# Patient Record
Sex: MALE | Race: WHITE | NOT HISPANIC OR LATINO
[De-identification: names, ages, dates, MRNs, and addresses within clinical notes are randomized per-mention and may not be internally consistent; named-entity substitution may affect disease eponyms.]

---

## 2017-03-04 PROBLEM — Z00.00 ENCOUNTER FOR PREVENTIVE HEALTH EXAMINATION: Status: ACTIVE | Noted: 2017-03-04

## 2017-03-21 ENCOUNTER — APPOINTMENT (OUTPATIENT)
Dept: OTOLARYNGOLOGY | Facility: CLINIC | Age: 42
End: 2017-03-21

## 2017-03-21 VITALS
HEIGHT: 76 IN | DIASTOLIC BLOOD PRESSURE: 82 MMHG | HEART RATE: 84 BPM | BODY MASS INDEX: 30.44 KG/M2 | SYSTOLIC BLOOD PRESSURE: 130 MMHG | WEIGHT: 250 LBS

## 2017-03-21 DIAGNOSIS — Z78.9 OTHER SPECIFIED HEALTH STATUS: ICD-10-CM

## 2017-03-21 DIAGNOSIS — Z82.49 FAMILY HISTORY OF ISCHEMIC HEART DISEASE AND OTHER DISEASES OF THE CIRCULATORY SYSTEM: ICD-10-CM

## 2017-03-26 PROBLEM — Z82.49 FAMILY HISTORY OF CONGESTIVE HEART FAILURE: Status: ACTIVE | Noted: 2017-03-21

## 2017-03-26 PROBLEM — Z82.49 FAMILY HISTORY OF HYPERTENSION: Status: ACTIVE | Noted: 2017-03-21

## 2017-03-26 PROBLEM — Z78.9 NON-SMOKER: Status: ACTIVE | Noted: 2017-03-21

## 2017-06-27 ENCOUNTER — APPOINTMENT (OUTPATIENT)
Dept: OTOLARYNGOLOGY | Facility: CLINIC | Age: 42
End: 2017-06-27

## 2017-06-27 VITALS
HEIGHT: 76 IN | BODY MASS INDEX: 30.44 KG/M2 | WEIGHT: 250 LBS | SYSTOLIC BLOOD PRESSURE: 139 MMHG | HEART RATE: 80 BPM | DIASTOLIC BLOOD PRESSURE: 83 MMHG

## 2017-11-14 ENCOUNTER — APPOINTMENT (OUTPATIENT)
Dept: OTOLARYNGOLOGY | Facility: CLINIC | Age: 42
End: 2017-11-14

## 2018-02-06 ENCOUNTER — APPOINTMENT (OUTPATIENT)
Dept: OTOLARYNGOLOGY | Facility: CLINIC | Age: 43
End: 2018-02-06
Payer: COMMERCIAL

## 2018-02-06 VITALS
HEART RATE: 81 BPM | BODY MASS INDEX: 30.44 KG/M2 | WEIGHT: 250 LBS | DIASTOLIC BLOOD PRESSURE: 78 MMHG | SYSTOLIC BLOOD PRESSURE: 121 MMHG | HEIGHT: 76 IN

## 2018-02-06 PROCEDURE — 31231 NASAL ENDOSCOPY DX: CPT

## 2018-02-06 PROCEDURE — 99213 OFFICE O/P EST LOW 20 MIN: CPT | Mod: 25

## 2018-05-04 ENCOUNTER — RX RENEWAL (OUTPATIENT)
Age: 43
End: 2018-05-04

## 2018-06-19 ENCOUNTER — APPOINTMENT (OUTPATIENT)
Dept: OTOLARYNGOLOGY | Facility: CLINIC | Age: 43
End: 2018-06-19
Payer: COMMERCIAL

## 2018-06-19 VITALS
BODY MASS INDEX: 30.44 KG/M2 | DIASTOLIC BLOOD PRESSURE: 86 MMHG | WEIGHT: 250 LBS | HEIGHT: 76 IN | HEART RATE: 83 BPM | SYSTOLIC BLOOD PRESSURE: 144 MMHG

## 2018-06-19 PROCEDURE — 31231 NASAL ENDOSCOPY DX: CPT

## 2018-06-19 PROCEDURE — 99214 OFFICE O/P EST MOD 30 MIN: CPT | Mod: 25

## 2018-06-19 RX ORDER — METHYLPREDNISOLONE 4 MG/1
4 TABLET ORAL
Qty: 1 | Refills: 0 | Status: ACTIVE | COMMUNITY
Start: 2018-06-19 | End: 1900-01-01

## 2018-06-25 RX ORDER — AMOXICILLIN 500 MG/1
500 CAPSULE ORAL
Qty: 10 | Refills: 0 | Status: ACTIVE | COMMUNITY
Start: 2018-06-14

## 2018-07-28 PROBLEM — Z78.9 ALCOHOL USE: Status: ACTIVE | Noted: 2017-03-21

## 2018-08-07 ENCOUNTER — APPOINTMENT (OUTPATIENT)
Dept: OTOLARYNGOLOGY | Facility: CLINIC | Age: 43
End: 2018-08-07

## 2018-09-06 RX ORDER — AZITHROMYCIN 250 MG/1
250 TABLET, FILM COATED ORAL
Qty: 1 | Refills: 0 | Status: ACTIVE | COMMUNITY
Start: 2018-09-06 | End: 1900-01-01

## 2018-09-06 RX ORDER — METHYLPREDNISOLONE 4 MG/1
4 TABLET ORAL
Qty: 1 | Refills: 0 | Status: ACTIVE | COMMUNITY
Start: 2018-09-06 | End: 1900-01-01

## 2018-10-09 ENCOUNTER — RX RENEWAL (OUTPATIENT)
Age: 43
End: 2018-10-09

## 2019-03-01 ENCOUNTER — RX RENEWAL (OUTPATIENT)
Age: 44
End: 2019-03-01

## 2019-03-12 ENCOUNTER — APPOINTMENT (OUTPATIENT)
Dept: OTOLARYNGOLOGY | Facility: CLINIC | Age: 44
End: 2019-03-12
Payer: COMMERCIAL

## 2019-03-12 PROCEDURE — 69210 REMOVE IMPACTED EAR WAX UNI: CPT

## 2019-03-12 PROCEDURE — 31231 NASAL ENDOSCOPY DX: CPT

## 2019-03-12 PROCEDURE — 99214 OFFICE O/P EST MOD 30 MIN: CPT | Mod: 25

## 2019-03-12 NOTE — CONSULT LETTER
[Dear  ___] : Dear  [unfilled], [Courtesy Letter:] : I had the pleasure of seeing your patient, [unfilled], in my office today. [Consult Closing:] : Thank you very much for allowing me to participate in the care of this patient.  If you have any questions, please do not hesitate to contact me. [Sincerely,] : Sincerely, [Ruben Burciaga MD] : Ruben Burciaga MD  [Department of Otolaryngology, Head and Neck Surgery] : Department of Otolaryngology, Head and Neck Surgery [[facility]] : [facility]

## 2019-03-12 NOTE — HISTORY OF PRESENT ILLNESS
[de-identified] : 43M here for in followup.\par \par He is doing well since last seen 9 months prior with no new issues. There is mild intermittent frontal pressure.\par I have seen him several times in management of long standing CRS with polyposis.\par No new sinus issues since last seen. No further sinus infections. Remains on the daily qnasl.\par H/o 4 prior ESS for CRS w polyposis. Most recent surgery 3/2014.\par Remains on immunotherapy, q3week maintenance shots.\par He is doing well. Olfaction intact. No rhinorrhea.\par Hearing normal, no ear fullness or pressure.\par \par Last infection was >1yr ago and was given zpak and it resolved. Needed oral steroid taper once this past year. Prior to that, he does not remember the last time he took oral steroids/abx for infection.\par \par ROS otherwise negative.

## 2019-03-12 NOTE — PHYSICAL EXAM
[Midline] : trachea located in midline position [Normal] : no rashes [Rinne Test Air Conduction Persists > Bone Conduction Right] : air conduction greater than bone conduction on the right [Rinne Test Air Conduction Persists > Bone Conduction Left] : air conduction greater than bone conduction on the left [Hearing Hawkins Test (Tuning Fork On Forehead)] : no lateralization of tone [FreeTextEntry1] : Au: mild cerumen removed. eac clear and dry, TM intact and mobile, ME clear no effusion\par

## 2019-03-12 NOTE — ASSESSMENT
[FreeTextEntry1] : 43M with chronic rhinosinusitis with polyposis here in followup. He is doing well since last seen 9 months prior with no new issues. He is s/p four prior ESS, most recently in 2014. He has done quite well since the last surgery and has only had one sinus infection in the past few years. He remains on daily qnasl and immunotherapy for q3wk maintenance shots. His last infection was >1yr ago and was given zpak and it resolved. He has needed oral steroid taper once this past year. Prior to that, he does not remember the last time he took oral steroids/abx for infection. On exam today, nasal endoscopy shows slightly increased sinonasal polyposis burden; there are patent middle meati, antrostomies and sphenoids, but polyps/polypoid edema fill the ethmoidectomy bed, the line skull base and occlude both frontal outflows. Despite his chronic polyp burden, he still remains stable with minimal complaints. His frontal pressure is due to frontal occlusion due to polyposis. Cont daily qnasl and IT as before. Will see back in 4-6 months, or sooner, should anything develop in the interim. He may need revision surgery in the future should his polyps continue to regrow.

## 2019-03-12 NOTE — PROCEDURE
[FreeTextEntry3] : Nasal Endoscopy:\par R NC: IT lateralized. MM patent, antrostomy patent; polyps fill ethmoidectomy bed and line skull base and occlude frontal outflow w slight increased polyp burden; sphenoid patent. no mucopus. thick crust removed from posterior nasal cavity.\par L NC: IT lateralized. s/p partial middle turbinectomy w middle turbinate remnant abutting the posterior antrostomy. widely patent antrostomy w mild recirculation mucus removed. polyps fill ethmoidectomy bed, line skull base and occlude frontal outflow w interval increase; sphenoid patent. no mucopus.

## 2019-09-03 ENCOUNTER — APPOINTMENT (OUTPATIENT)
Dept: OTOLARYNGOLOGY | Facility: CLINIC | Age: 44
End: 2019-09-03

## 2019-11-04 RX ORDER — BECLOMETHASONE DIPROPIONATE 80 UG/1
80 AEROSOL, METERED NASAL
Qty: 1 | Refills: 5 | Status: ACTIVE | COMMUNITY
Start: 2017-03-23 | End: 1900-01-01

## 2019-11-12 ENCOUNTER — APPOINTMENT (OUTPATIENT)
Dept: OTOLARYNGOLOGY | Facility: CLINIC | Age: 44
End: 2019-11-12
Payer: COMMERCIAL

## 2019-11-12 ENCOUNTER — LABORATORY RESULT (OUTPATIENT)
Age: 44
End: 2019-11-12

## 2019-11-12 PROCEDURE — 99213 OFFICE O/P EST LOW 20 MIN: CPT | Mod: 25

## 2019-11-12 PROCEDURE — 31231 NASAL ENDOSCOPY DX: CPT

## 2019-11-12 RX ORDER — METHYLPREDNISOLONE 4 MG/1
4 TABLET ORAL
Qty: 1 | Refills: 0 | Status: ACTIVE | COMMUNITY
Start: 2019-11-12 | End: 1900-01-01

## 2019-11-12 NOTE — HISTORY OF PRESENT ILLNESS
[de-identified] : 43M here in followup.\par \par Over the past 2 weeks or so, he has had thick mucus, congestion and postnasal drainage with facial pressure. During this time, he is spitting up thick blobs of bloody mucus. He has been using neilmed rinses for the past 2-3 days without improvement. He remains on daily qnasl.\par One month ago he developed a bronchitis with coughing, but this resolved. Then the above sx developed which have persisted for the past 2 weeks.\par \par He has long standing h/o CRS with polyposis and I have seen him many times regarding it, most recently 9 months ago.\par H/o 4 prior ESS for CRS w polyposis. Most recent surgery 3/2014.\par Remains on immunotherapy, q3week maintenance shots.\par \par Last infection was nearly 2 years ago and was given zpak and it resolved. Needed oral steroid taper once in >1yr. Prior to that, he does not remember the last time he took oral steroids/abx for infection.\par \par ROS otherwise negative.

## 2019-11-12 NOTE — PHYSICAL EXAM
[Midline] : trachea located in midline position [Normal] : no rashes [Rinne Test Air Conduction Persists > Bone Conduction Right] : air conduction greater than bone conduction on the right [Rinne Test Air Conduction Persists > Bone Conduction Left] : air conduction greater than bone conduction on the left [Hearing Hawkins Test (Tuning Fork On Forehead)] : no lateralization of tone [FreeTextEntry1] : \par

## 2019-11-12 NOTE — CONSULT LETTER
[Dear  ___] : Dear  [unfilled], [Consult Closing:] : Thank you very much for allowing me to participate in the care of this patient.  If you have any questions, please do not hesitate to contact me. [Courtesy Letter:] : I had the pleasure of seeing your patient, [unfilled], in my office today. [Sincerely,] : Sincerely, [[facility]] : [facility] [Ruben Burciaga MD] : Ruben Burciaga MD  [Department of Otolaryngology, Head and Neck Surgery] : Department of Otolaryngology, Head and Neck Surgery

## 2019-11-12 NOTE — ASSESSMENT
[FreeTextEntry1] : 43M here in followup. He has long standing h/o chronic rhinosinusitis with polyposis; I have seen him many times regarding it, most recently 9 months ago.\par Over the past 2 weeks or so, he has had thick mucus, congestion and postnasal drainage with facial pressure. During this time, he is spitting up thick blobs of bloody mucus. He has been using neilmed rinses for the past 2-3 days without improvement. He remains on daily qnasl and allergy shots.\par One month ago he developed a bronchitis with coughing, but this resolved. Then the above sx developed which have persisted for the past 2 weeks. Prior to this, he had been doing quite well with only one course of steroids and antibiotics in nearly 2 years.\par On exam today, nasal endoscopy shows increased polyp burden and sinonasal mucosal edema with thin mucoid drainage. There is buildup of thick tenacious crust overlying the nasopharynx which was removed.\par He has acute on chronic exacerbation of his sinonasal disease/polyps and there is also buildup of thick crusting over his nasopharynx, the latter of which I am unsure as to why, since he has not had this in the past. Either way, he needs to reset inflammatory cycle so will give oral steroid taper. Also, needs to optimize nasal hygiene given his prior surgeries and underlying mucosal disease with steroid rinses (he had only been doing qnasl for years), will start him on mometasone rinses. Lastly, I will add abx (topical vs oral) pending cx results. Hopefully this will be sufficient and sx will improve. He will let me know how he is doing over the next 2-3 weeks.

## 2019-11-12 NOTE — PROCEDURE
[FreeTextEntry3] : Nasal Endoscopy:\par R NC: IT lateralized. MM patent, antrostomy patent; polyps fill ethmoidectomy bed and line skull base and occlude frontal outflow w increased polyp burden and thin mucoid drainage\par L NC: IT lateralized. s/p partial middle turbinectomy w middle turbinate remnant abutting the posterior antrostomy. widely patent antrostomy w mucosal edema and thin mucoid drainage. polyps fill ethmoidectomy bed, line skull base and occlude frontal outflow w interval increase in edema\par thick tenacious crust removed from nasopharyngeal wall, cx taken

## 2019-11-18 RX ORDER — SULFAMETHOXAZOLE AND TRIMETHOPRIM 800; 160 MG/1; MG/1
800-160 TABLET ORAL TWICE DAILY
Qty: 20 | Refills: 0 | Status: ACTIVE | COMMUNITY
Start: 2019-11-18 | End: 1900-01-01

## 2022-07-19 ENCOUNTER — APPOINTMENT (OUTPATIENT)
Dept: OTOLARYNGOLOGY | Facility: CLINIC | Age: 47
End: 2022-07-19

## 2022-07-19 VITALS
WEIGHT: 250 LBS | DIASTOLIC BLOOD PRESSURE: 97 MMHG | TEMPERATURE: 97.9 F | BODY MASS INDEX: 30.44 KG/M2 | HEART RATE: 115 BPM | SYSTOLIC BLOOD PRESSURE: 127 MMHG | HEIGHT: 76 IN

## 2022-07-19 DIAGNOSIS — H91.90 UNSPECIFIED HEARING LOSS, UNSPECIFIED EAR: ICD-10-CM

## 2022-07-19 DIAGNOSIS — H61.23 IMPACTED CERUMEN, BILATERAL: ICD-10-CM

## 2022-07-19 PROCEDURE — 69210 REMOVE IMPACTED EAR WAX UNI: CPT

## 2022-07-19 PROCEDURE — 31231 NASAL ENDOSCOPY DX: CPT | Mod: 59

## 2022-07-19 PROCEDURE — 99213 OFFICE O/P EST LOW 20 MIN: CPT | Mod: 25

## 2022-07-19 NOTE — ASSESSMENT
[FreeTextEntry1] : 46M w long standing h/o chronic rhinosinusitis with polyposis here in followup. He is doing very well since last seen >2 yrs ago. He sx of thick mucus, congestion and postnasal drainage with facial pressure remain controlled. He uses mometasone/mupirocin rinses around 5-10 times per month which help a great deal. He thinks cerumen has reaccumulated. He has done well and has only been on abx/steroids twice in around 4 years.\par On exam today, nasal endoscopy shows persistent moderate/moderately severe polyp burden (right>left) and polypoid sinonasal mucosal edema; there is no mucopus and the nasopharynx is patent without debris.\par He has chronic pansinusitis w polyposis and despite polyp/inflammatory burden, feels mostly baseline. He is much better w the mometasone/mupirocin rinses - so to get better baseline sx control, should do it w more regularity. There is no doubt his baseline can be much better whether he knows it or not. Cerumen was removed from either EAC w relief. RTO 6 months.

## 2022-07-19 NOTE — PHYSICAL EXAM
[Midline] : trachea located in midline position [Normal] : no rashes [Rinne Test Air Conduction Persists > Bone Conduction Right] : air conduction greater than bone conduction on the right [Rinne Test Air Conduction Persists > Bone Conduction Left] : air conduction greater than bone conduction on the left [Hearing Hawkins Test (Tuning Fork On Forehead)] : no lateralization of tone [FreeTextEntry1] : Au: EAC occluded w thick cerumen removed w curet. TM intact, ME clear

## 2022-07-19 NOTE — PROCEDURE
[FreeTextEntry3] : Nasal Endoscopy:\par R NC: IT lateralized. polyp extending into NC from MT/LW. MM narrow but patent, antrostomy patent; polyps fill ethmoidectomy bed. no mucopus, moderate clear mucus\par L NC: IT lateralized. s/p partial middle turbinectomy w middle turbinate remnant abutting the posterior antrostomy. polyps fill ethmoid bed w overlying mucus and crust removed\par nasopharynx patent, no mucoid debris or crusting

## 2022-07-19 NOTE — HISTORY OF PRESENT ILLNESS
[de-identified] : 46M here in followup.\par \par He is doing very well since last seen >2 yrs ago.\par He sx of thick mucus, congestion and postnasal drainage with facial pressure remain controlled. He uses mometasone/mupirocin rinses around 5-10 times per month which help a great deal. He thinks cerumen has reaccumulated.\par \par He has long standing h/o CRS with polyposis and nasopharyngitis.\par H/o 4 prior ESS for CRS w polyposis. Most recent surgery 3/2014.\par Remains on immunotherapy, q3week maintenance shots.\par \par Last time on abx/steroid was in 2019. Prior to that was 2 years before.\par \par ROS otherwise negative.

## 2022-07-19 NOTE — CONSULT LETTER
[Dear  ___] : Dear  [unfilled], [Courtesy Letter:] : I had the pleasure of seeing your patient, [unfilled], in my office today. [Consult Closing:] : Thank you very much for allowing me to participate in the care of this patient.  If you have any questions, please do not hesitate to contact me. [Sincerely,] : Sincerely, [Ruben Burciaga MD] : Ruben Burciaga MD  [Department of Otolaryngology, Head and Neck Surgery] : Department of Otolaryngology, Head and Neck Surgery [[facility]] : [facility]

## 2022-11-08 ENCOUNTER — APPOINTMENT (OUTPATIENT)
Dept: OTOLARYNGOLOGY | Facility: CLINIC | Age: 47
End: 2022-11-08

## 2022-11-08 VITALS
SYSTOLIC BLOOD PRESSURE: 129 MMHG | DIASTOLIC BLOOD PRESSURE: 96 MMHG | HEART RATE: 108 BPM | HEIGHT: 76 IN | BODY MASS INDEX: 31.66 KG/M2 | WEIGHT: 260 LBS | TEMPERATURE: 96 F

## 2022-11-08 DIAGNOSIS — H66.92 OTITIS MEDIA, UNSPECIFIED, LEFT EAR: ICD-10-CM

## 2022-11-08 PROCEDURE — 99214 OFFICE O/P EST MOD 30 MIN: CPT | Mod: 25

## 2022-11-08 PROCEDURE — 31231 NASAL ENDOSCOPY DX: CPT

## 2022-11-08 RX ORDER — AMOXICILLIN AND CLAVULANATE POTASSIUM 875; 125 MG/1; MG/1
875-125 TABLET, COATED ORAL
Qty: 14 | Refills: 0 | Status: ACTIVE | COMMUNITY
Start: 2022-11-08 | End: 1900-01-01

## 2022-11-08 RX ORDER — PREDNISONE 10 MG/1
10 TABLET ORAL
Qty: 38 | Refills: 0 | Status: ACTIVE | COMMUNITY
Start: 2022-11-08 | End: 1900-01-01

## 2022-11-09 PROBLEM — H66.92 ACUTE OTITIS MEDIA, LEFT: Status: RESOLVED | Noted: 2022-11-09 | Resolved: 2022-12-09

## 2022-11-09 RX ORDER — CIPROFLOXACIN AND DEXAMETHASONE 3; 1 MG/ML; MG/ML
0.3-0.1 SUSPENSION/ DROPS AURICULAR (OTIC)
Qty: 7 | Refills: 0 | Status: ACTIVE | COMMUNITY
Start: 2022-11-06

## 2022-11-09 RX ORDER — FLUTICASONE FUROATE AND VILANTEROL TRIFENATATE 100; 25 UG/1; UG/1
100-25 POWDER RESPIRATORY (INHALATION)
Qty: 60 | Refills: 0 | Status: ACTIVE | COMMUNITY
Start: 2022-05-31

## 2022-11-09 RX ORDER — AMOXICILLIN 875 MG/1
875 TABLET, FILM COATED ORAL
Qty: 14 | Refills: 0 | Status: ACTIVE | COMMUNITY
Start: 2022-11-06

## 2022-11-09 NOTE — ASSESSMENT
[FreeTextEntry1] : 46M w long standing h/o chronic rhinosinusitis with polyposis here in followup.\par Around 3 days ago he developed left sided throat/ear/jaw discomfort which was then followed by severe left ear pain and muffled left sided hearing. He went to urgent care and was given amoxicillin, flonase and ciprodex and is here for evaluation. The ear pain has significantly improved, but there remains unchanged muffled left sided hearing. His sx of thick mucus, congestion and postnasal drainage with facial pressure remain mostly controlled. He uses mometasone/mupirocin rinses with some regularity which help.\par On exam today, nasal endoscopy shows persistent moderate/moderately severe polyp burden and polypoid sinonasal mucosal edema. The left TM is erythematous and immobile and the left ME has a sanginomucoid effusion.\par He has an acute left otitis media; I do not think this is due to rinses getting into the middle ear. He also has chronic pansinusitis w polyposis with persistent significant polyp/inflammatory burden.\par Will switch to augmentin and add prednisone taper, of which the latter will also help his polyps. Stop the otic drops. Can use flonase instead of rinses while on the steroids. He will let me know how he is doing over the next 2-3 weeks to ensure improvement.

## 2022-11-09 NOTE — PROCEDURE
[FreeTextEntry3] : Nasal Endoscopy:\par R NC: IT lateralized. polyp extending into NC from MT/LW. MM narrow but patent, antrostomy patent; polyps fill ethmoidectomy bed. no mucopus, moderate clear mucus\par L NC: IT lateralized. s/p partial middle turbinectomy w middle turbinate remnant abutting the posterior antrostomy. polyps fill ethmoid bed w overlying mucus and crust removed\par nasopharynx patent, no mucoid debris or crusting\par choana w minimal crusting and erythema. ETO patent

## 2022-11-09 NOTE — PHYSICAL EXAM
[Midline] : trachea located in midline position [Normal] : no rashes [Rinne Test Air Conduction Persists > Bone Conduction Right] : air conduction greater than bone conduction on the right [Rinne Test Air Conduction Persists > Bone Conduction Left] : air conduction greater than bone conduction on the left [Hearing Hawkins Test (Tuning Fork On Forehead)] : no lateralization of tone [FreeTextEntry1] : Ad: EAC clear, TM intact and mobile, ME clear\par As: EAC clear, TM intact w erythema, immobile. ME w mucoid/sanginous effusion

## 2022-11-09 NOTE — HISTORY OF PRESENT ILLNESS
[de-identified] : 46M here in followup.\par \par Around 3 days ago he developed left sided throat/ear/jaw discomfort which was then followed by severe left ear pain and muffled left sided hearing. He went to urgent care and was given amoxicillin, flonase and ciprodex and is here for evaluation. The ear pain has significantly improved, but there remains unchanged muffled left sided hearing.\par \par He sx of thick mucus, congestion and postnasal drainage with facial pressure remain mostly controlled. He uses mometasone/mupirocin rinses with some regularity which help.\par \par He has long standing h/o CRS with polyposis and nasopharyngitis.\par H/o 4 prior ESS for CRS w polyposis. Most recent surgery 3/2014.\par Remains on immunotherapy, q3week maintenance shots.\par \par ROS otherwise negative.

## 2022-11-09 NOTE — CONSULT LETTER
[Dear  ___] : Dear  [unfilled], [Courtesy Letter:] : I had the pleasure of seeing your patient, [unfilled], in my office today. [Consult Closing:] : Thank you very much for allowing me to participate in the care of this patient.  If you have any questions, please do not hesitate to contact me. [Sincerely,] : Sincerely, [Ruben Burciaga MD] : Ruben Burciaga MD  [Department of Otolaryngology, Head and Neck Surgery] : Department of Otolaryngology, Head and Neck Surgery [[facility]] : [facility]

## 2023-01-12 ENCOUNTER — OUTPATIENT (OUTPATIENT)
Dept: OUTPATIENT SERVICES | Facility: HOSPITAL | Age: 48
LOS: 1 days | End: 2023-01-12
Payer: COMMERCIAL

## 2023-01-12 ENCOUNTER — APPOINTMENT (OUTPATIENT)
Dept: CT IMAGING | Facility: HOSPITAL | Age: 48
End: 2023-01-12

## 2023-01-12 PROCEDURE — 70486 CT MAXILLOFACIAL W/O DYE: CPT | Mod: 26

## 2023-01-12 PROCEDURE — 70486 CT MAXILLOFACIAL W/O DYE: CPT

## 2023-02-14 ENCOUNTER — TRANSCRIPTION ENCOUNTER (OUTPATIENT)
Age: 48
End: 2023-02-14

## 2023-02-14 NOTE — ASU PATIENT PROFILE, ADULT - REASON FOR ADMISSION, PROFILE
SEPTOPLASTY, TURBINOPLASTY, BL MAX ANTROSTOMY AND BL SPHENOIDOTOMY W/ REMOVAL OF TISSUE, ETHMOIDECTOMY TOTAL INCLUDING FRONTAL SINUSOTOMY W/ NAVIGATION

## 2023-02-14 NOTE — ASU PATIENT PROFILE, ADULT - NSICDXPASTMEDICALHX_GEN_ALL_CORE_FT
PAST MEDICAL HISTORY:  History of sinus problem      PAST MEDICAL HISTORY:  Acid reflux     History of sinus problem

## 2023-02-15 ENCOUNTER — APPOINTMENT (OUTPATIENT)
Dept: OTOLARYNGOLOGY | Facility: HOSPITAL | Age: 48
End: 2023-02-15

## 2023-02-15 ENCOUNTER — TRANSCRIPTION ENCOUNTER (OUTPATIENT)
Age: 48
End: 2023-02-15

## 2023-02-15 ENCOUNTER — OUTPATIENT (OUTPATIENT)
Dept: OUTPATIENT SERVICES | Facility: HOSPITAL | Age: 48
LOS: 1 days | Discharge: HOME | End: 2023-02-15
Payer: COMMERCIAL

## 2023-02-15 ENCOUNTER — RESULT REVIEW (OUTPATIENT)
Age: 48
End: 2023-02-15

## 2023-02-15 VITALS
SYSTOLIC BLOOD PRESSURE: 134 MMHG | DIASTOLIC BLOOD PRESSURE: 89 MMHG | HEART RATE: 87 BPM | WEIGHT: 259.26 LBS | OXYGEN SATURATION: 96 % | HEIGHT: 76 IN | RESPIRATION RATE: 16 BRPM | TEMPERATURE: 98 F

## 2023-02-15 VITALS
OXYGEN SATURATION: 100 % | DIASTOLIC BLOOD PRESSURE: 83 MMHG | SYSTOLIC BLOOD PRESSURE: 147 MMHG | RESPIRATION RATE: 16 BRPM | TEMPERATURE: 98 F | HEART RATE: 92 BPM

## 2023-02-15 DIAGNOSIS — Z98.890 OTHER SPECIFIED POSTPROCEDURAL STATES: Chronic | ICD-10-CM

## 2023-02-15 LAB
GRAM STN FLD: SIGNIFICANT CHANGE UP
SPECIMEN SOURCE: SIGNIFICANT CHANGE UP

## 2023-02-15 PROCEDURE — 61782 SCAN PROC CRANIAL EXTRA: CPT

## 2023-02-15 PROCEDURE — 88311 DECALCIFY TISSUE: CPT | Mod: 26

## 2023-02-15 PROCEDURE — 31253 NSL/SINS NDSC TOTAL: CPT | Mod: 50

## 2023-02-15 PROCEDURE — 88304 TISSUE EXAM BY PATHOLOGIST: CPT | Mod: 26

## 2023-02-15 PROCEDURE — 30130 EXCISE INFERIOR TURBINATE: CPT | Mod: 50

## 2023-02-15 PROCEDURE — 88305 TISSUE EXAM BY PATHOLOGIST: CPT | Mod: 26

## 2023-02-15 PROCEDURE — 31267 ENDOSCOPY MAXILLARY SINUS: CPT | Mod: 50

## 2023-02-15 PROCEDURE — 31288 NASAL/SINUS ENDOSCOPY SURG: CPT | Mod: 50

## 2023-02-15 DEVICE — STENT SINUS DRUG ELUDING PROPEL CONTOUR: Type: IMPLANTABLE DEVICE | Status: FUNCTIONAL

## 2023-02-15 DEVICE — STENT DRUG ELUTING SINUS BIO ABSORB INTERSECT ENT PROPEL 23MM: Type: IMPLANTABLE DEVICE | Status: FUNCTIONAL

## 2023-02-15 RX ORDER — SULFAMETHOXAZOLE AND TRIMETHOPRIM 800; 160 MG/1; MG/1
800-160 TABLET ORAL TWICE DAILY
Qty: 28 | Refills: 0 | Status: ACTIVE | COMMUNITY
Start: 2023-02-15 | End: 1900-01-01

## 2023-02-15 RX ORDER — SODIUM CHLORIDE 0.65 %
0.65 AEROSOL, SPRAY (ML) NASAL
Qty: 1 | Refills: 5 | Status: ACTIVE | COMMUNITY
Start: 2023-02-15 | End: 1900-01-01

## 2023-02-15 RX ORDER — MORPHINE SULFATE 50 MG/1
2 CAPSULE, EXTENDED RELEASE ORAL
Refills: 0 | Status: DISCONTINUED | OUTPATIENT
Start: 2023-02-15 | End: 2023-02-15

## 2023-02-15 RX ORDER — SODIUM CHLORIDE 9 MG/ML
1000 INJECTION, SOLUTION INTRAVENOUS
Refills: 0 | Status: DISCONTINUED | OUTPATIENT
Start: 2023-02-15 | End: 2023-02-15

## 2023-02-15 RX ORDER — OXYCODONE AND ACETAMINOPHEN 5; 325 MG/1; MG/1
5-325 TABLET ORAL
Qty: 30 | Refills: 0 | Status: ACTIVE | COMMUNITY
Start: 2023-02-15 | End: 1900-01-01

## 2023-02-15 RX ORDER — ACETAMINOPHEN 500 MG
1000 TABLET ORAL ONCE
Refills: 0 | Status: COMPLETED | OUTPATIENT
Start: 2023-02-15 | End: 2023-02-15

## 2023-02-15 RX ORDER — APREPITANT 80 MG/1
40 CAPSULE ORAL ONCE
Refills: 0 | Status: COMPLETED | OUTPATIENT
Start: 2023-02-15 | End: 2023-02-15

## 2023-02-15 RX ORDER — PREDNISONE 10 MG/1
10 TABLET ORAL
Qty: 28 | Refills: 0 | Status: ACTIVE | COMMUNITY
Start: 2023-02-15 | End: 1900-01-01

## 2023-02-15 RX ORDER — OXYCODONE HYDROCHLORIDE 5 MG/1
5 TABLET ORAL ONCE
Refills: 0 | Status: DISCONTINUED | OUTPATIENT
Start: 2023-02-15 | End: 2023-02-15

## 2023-02-15 RX ADMIN — SODIUM CHLORIDE 100 MILLILITER(S): 9 INJECTION, SOLUTION INTRAVENOUS at 12:49

## 2023-02-15 RX ADMIN — Medication 1000 MILLIGRAM(S): at 08:31

## 2023-02-15 RX ADMIN — APREPITANT 40 MILLIGRAM(S): 80 CAPSULE ORAL at 08:31

## 2023-02-15 NOTE — ASU DISCHARGE PLAN (ADULT/PEDIATRIC) - CARE PROVIDER_API CALL
Ruben Burciaga)  Otolaryngology  [address] [place] [address], [address]  [place], [place] [address]  Phone: [phone]  Fax: [phone]  Follow Up Time:

## 2023-02-15 NOTE — ASU DISCHARGE PLAN (ADULT/PEDIATRIC) - ASU DC SPECIAL INSTRUCTIONSFT
DO NOT blow your nose for at least two weeks. DO NOT forcibly spit for one week. DO NOT smoke or use smokeless tobacco; smoking greatly inhibits the healing process, especially in the sinuses. Sneeze with your MOUTH OPEN. If the urge to sneeze arises, do not sneeze through your nose and avoid pinching nostrils.    Slight bleeding from the nose is not uncommon and may occur for several days after surgery.     Call clinic or come to ED if you have bleeding that does not stop with pressure.    Take pain meds, bactrim, steroids and nasals sprays as prescribed.    Warning Signs:  Please call your doctor or nurse practitioner if you experience the following:  *You experience new chest pain, pressure, squeezing or tightness.  *New or worsening cough, shortness of breath, or wheeze.  *If you are vomiting and cannot keep down fluids or your medications.  *You are getting dehydrated due to continued vomiting, diarrhea, or other reasons. Signs of dehydration include dry mouth, rapid heartbeat, or feeling dizzy or faint when standing.  *You experience burning when you urinate, have blood in your urine, or experience a discharge.  *Your pain is not improving within 8-12 hours or is not gone within 24 hours.  *You have shaking chills, or fever greater than 101.5 degrees Fahrenheit or 38 degrees Celsius.  *Any change in your symptoms, or any new symptoms that concern you.

## 2023-02-15 NOTE — ASU DISCHARGE PLAN (ADULT/PEDIATRIC) - NS MD DC FALL RISK RISK
For information on Fall & Injury Prevention, visit: [email or web address] OR  [email or web address] OR  https://www.cdc.gov/steadi/patient.html

## 2023-02-15 NOTE — ASU DISCHARGE PLAN (ADULT/PEDIATRIC) - CALL YOUR DOCTOR IF YOU HAVE ANY OF THE FOLLOWING:
Bleeding that does not stop/Pain not relieved by Medications/Fever greater than (need to indicate Fahrenheit or Celsius)/Wound/Surgical Site with redness, or foul smelling discharge or pus/Numbness, tingling, color or temperature change to extremity/Inability to tolerate liquids or foods

## 2023-02-18 LAB
-  CLINDAMYCIN: SIGNIFICANT CHANGE UP
-  CLINDAMYCIN: SIGNIFICANT CHANGE UP
-  ERYTHROMYCIN: SIGNIFICANT CHANGE UP
-  ERYTHROMYCIN: SIGNIFICANT CHANGE UP
-  LINEZOLID: SIGNIFICANT CHANGE UP
-  LINEZOLID: SIGNIFICANT CHANGE UP
-  OXACILLIN: SIGNIFICANT CHANGE UP
-  OXACILLIN: SIGNIFICANT CHANGE UP
-  RIFAMPIN: SIGNIFICANT CHANGE UP
-  RIFAMPIN: SIGNIFICANT CHANGE UP
-  TRIMETHOPRIM/SULFAMETHOXAZOLE: SIGNIFICANT CHANGE UP
-  TRIMETHOPRIM/SULFAMETHOXAZOLE: SIGNIFICANT CHANGE UP
-  VANCOMYCIN: SIGNIFICANT CHANGE UP
-  VANCOMYCIN: SIGNIFICANT CHANGE UP
CULTURE RESULTS: SIGNIFICANT CHANGE UP
METHOD TYPE: SIGNIFICANT CHANGE UP
METHOD TYPE: SIGNIFICANT CHANGE UP
ORGANISM # SPEC MICROSCOPIC CNT: SIGNIFICANT CHANGE UP
SPECIMEN SOURCE: SIGNIFICANT CHANGE UP

## 2023-02-21 ENCOUNTER — APPOINTMENT (OUTPATIENT)
Dept: OTOLARYNGOLOGY | Facility: CLINIC | Age: 48
End: 2023-02-21
Payer: COMMERCIAL

## 2023-02-21 PROBLEM — K21.9 GASTRO-ESOPHAGEAL REFLUX DISEASE WITHOUT ESOPHAGITIS: Chronic | Status: ACTIVE | Noted: 2023-02-14

## 2023-02-21 PROBLEM — Z87.09 PERSONAL HISTORY OF OTHER DISEASES OF THE RESPIRATORY SYSTEM: Chronic | Status: ACTIVE | Noted: 2023-02-14

## 2023-02-21 PROCEDURE — 99024 POSTOP FOLLOW-UP VISIT: CPT

## 2023-02-21 PROCEDURE — 31237 NSL/SINS NDSC SURG BX POLYPC: CPT | Mod: 58

## 2023-02-21 RX ORDER — MUPIROCIN 20 MG/G
2 OINTMENT TOPICAL
Qty: 1 | Refills: 5 | Status: ACTIVE | COMMUNITY
Start: 2019-11-18 | End: 1900-01-01

## 2023-02-21 RX ORDER — MUPIROCIN 2 G/100G
2 CREAM TOPICAL
Qty: 1 | Refills: 5 | Status: ACTIVE | COMMUNITY
Start: 2022-07-19 | End: 1900-01-01

## 2023-02-21 RX ORDER — MOMETASONE FUROATE 100 %
POWDER (GRAM) MISCELLANEOUS
Qty: 1 | Refills: 3 | Status: ACTIVE | COMMUNITY
Start: 2019-11-12 | End: 1900-01-01

## 2023-02-21 NOTE — HISTORY OF PRESENT ILLNESS
[de-identified] : 47M here in first postoperative visit s/p revision ESS (maxillary, ethmoid, sphenoid, 2a frontal) 2/15/23 for chronic pansinusitis with polyposis. Intraoperatively, polypoid edema and osteitis throughout w mucin.\par \par He is doing well since surgery. Pain is controlled. There is no headache or drainage. He took the meds as tolerated.\par \par OR Cx:\par -rare staph epi\par -rare staph capitis\par \par ROS otherwise unremarkable.\par

## 2023-02-21 NOTE — CONSULT LETTER
[Dear  ___] : Dear  [unfilled], [Courtesy Letter:] : I had the pleasure of seeing your patient, [unfilled], in my office today. [Consult Closing:] : Thank you very much for allowing me to participate in the care of this patient.  If you have any questions, please do not hesitate to contact me. [Sincerely,] : Sincerely, [Ruben Burciaga MD] : Ruben Burciaga MD  [Department of Otolaryngology, Head and Neck Surgery] : Department of Otolaryngology, Head and Neck Surgery [[facility]] : [facility]

## 2023-02-21 NOTE — PHYSICAL EXAM
[Nasal Endoscopy Performed] : nasal endoscopy was performed, see procedure section for findings [Midline] : trachea located in midline position [Normal] : no rashes [Rinne Test Air Conduction Persists > Bone Conduction Right] : air conduction greater than bone conduction on the right [Rinne Test Air Conduction Persists > Bone Conduction Left] : air conduction greater than bone conduction on the left [Hearing Hawkins Test (Tuning Fork On Forehead)] : no lateralization of tone [FreeTextEntry1] : Au: EAC clear, TM intact and mobile, ME clear\par

## 2023-02-21 NOTE — PROCEDURE
[FreeTextEntry3] : doyles removed\par \par Nasal Endoscopy:\par coagulum and debris removed\par nasal airways patent\par paranasal sinuses widely patent\par contours in frontals\par no mucopus or polyps, no clear rhinorrhea

## 2023-02-21 NOTE — ASSESSMENT
[FreeTextEntry1] : 47M here in first postoperative visit s/p revision ESS (maxillary, ethmoid, sphenoid, 2a frontal) 2/15/23 for chronic pansinusitis with polyposis. Intraoperatively, polypoid edema and osteitis throughout w mucin. He is doing well since surgery. Pain is controlled. There is no headache or drainage. He took the meds as tolerated. On exam, nasal endoscopy shows expected well healing postoperative changes w widely patent nasla airways and paranasal sinuses. He felt much better after debridement.\par He is doing very well. To restart mometasone/mupirocin rinses BID. Slowly advance activity. RTO 4 weeks in routine f/u. \par

## 2023-02-27 LAB
EAR NOSE AND THROAT CULTURE: NORMAL
SURGICAL PATHOLOGY STUDY: SIGNIFICANT CHANGE UP

## 2023-03-21 ENCOUNTER — APPOINTMENT (OUTPATIENT)
Dept: OTOLARYNGOLOGY | Facility: CLINIC | Age: 48
End: 2023-03-21
Payer: COMMERCIAL

## 2023-03-21 VITALS — TEMPERATURE: 96 F | HEIGHT: 76 IN | BODY MASS INDEX: 31.66 KG/M2 | WEIGHT: 260 LBS

## 2023-03-21 PROCEDURE — 99024 POSTOP FOLLOW-UP VISIT: CPT

## 2023-03-21 PROCEDURE — 31237 NSL/SINS NDSC SURG BX POLYPC: CPT | Mod: 58

## 2023-03-21 NOTE — HISTORY OF PRESENT ILLNESS
[de-identified] : 47M here in second postoperative visit s/p revision ESS (maxillary, ethmoid, sphenoid, 2a frontal) 2/15/23 for chronic pansinusitis with polyposis. Intraoperatively, polypoid edema and osteitis throughout w mucin.\par \par He is doing very well since last seen. He did have some bad nasal odor but that has passed with continued rinsing. He is breathing great. There is no headache or drainage. He is using mometasone/mupirocin rinses.\par \par OR Cx:\par -rare staph epi\par -rare staph capitis\par \par ROS otherwise unremarkable.

## 2023-03-21 NOTE — ASSESSMENT
[FreeTextEntry1] : 47M here in second postoperative visit s/p revision ESS (maxillary, ethmoid, sphenoid, 2a frontal) 2/15/23 for chronic pansinusitis with polyposis. Intraoperatively, polypoid edema and osteitis throughout w mucin. He is doing very well since last seen. He did have some bad nasal odor but that has passed with continued rinsing. He is breathing great. There is no headache or drainage. He is using mometasone/mupirocin rinses. On exam, nasal endoscopy shows expected well healing postoperative changes w widely patent nasal airways and paranasal sinuses. There was a fair amount of crusting which was removed and mild ethmoid edema, but no mucopus and he felt much better after debridement.\par He is doing well. Continue mometasone/mupirocin rinses BID. Will f/u cx in interim to see if any tx needed. RTO 8 weeks in routine f/u. \par

## 2023-03-21 NOTE — CONSULT LETTER
[Dear  ___] : Dear  [unfilled], [Courtesy Letter:] : I had the pleasure of seeing your patient, [unfilled], in my office today. [Consult Closing:] : Thank you very much for allowing me to participate in the care of this patient.  If you have any questions, please do not hesitate to contact me. [Sincerely,] : Sincerely, [Ruben Burciaga MD] : Ruben Burciaga MD  [Department of Otolaryngology, Head and Neck Surgery] : Department of Otolaryngology, Head and Neck Surgery [[facility]] : [facility]

## 2023-03-21 NOTE — PROCEDURE
[FreeTextEntry3] : Nasal Endoscopy:\par moderate crusting removed with some mucus -> cx taken\par mild edema ethmoid roofs, no mucopus or mucin\par paranasal sinuses widely patent\par contour remnants in frontals -> widely patent\par no mucopus or polyps, no clear rhinorrhea

## 2023-03-24 LAB — EAR NOSE AND THROAT CULTURE: ABNORMAL

## 2023-03-24 RX ORDER — LEVOFLOXACIN 750 MG/1
750 TABLET, FILM COATED ORAL DAILY
Qty: 14 | Refills: 0 | Status: ACTIVE | COMMUNITY
Start: 2023-03-24 | End: 1900-01-01

## 2023-05-16 ENCOUNTER — APPOINTMENT (OUTPATIENT)
Dept: OTOLARYNGOLOGY | Facility: CLINIC | Age: 48
End: 2023-05-16
Payer: COMMERCIAL

## 2023-05-16 PROCEDURE — 31231 NASAL ENDOSCOPY DX: CPT | Mod: 58

## 2023-05-16 PROCEDURE — 99213 OFFICE O/P EST LOW 20 MIN: CPT | Mod: 25

## 2023-05-16 NOTE — CONSULT LETTER
[Dear  ___] : Dear  [unfilled], [Courtesy Letter:] : I had the pleasure of seeing your patient, [unfilled], in my office today. [Consult Closing:] : Thank you very much for allowing me to participate in the care of this patient.  If you have any questions, please do not hesitate to contact me. [Sincerely,] : Sincerely, [Department of Otolaryngology, Head and Neck Surgery] : Department of Otolaryngology, Head and Neck Surgery [Ruben Burciaga MD] : Ruben Burciaga MD  [[facility]] : [facility]

## 2023-05-16 NOTE — PROCEDURE
[FreeTextEntry3] : Nasal Endoscopy:\par nasal airways widely patent\par moderate crusting removed from left nasal cavity\par paranasal sinuses widely patent, minimal ethmoid edema\par no mucopus or polyps, no clear rhinorrhea

## 2023-05-16 NOTE — HISTORY OF PRESENT ILLNESS
[de-identified] : 47M here in routine postoperative visit s/p revision ESS (maxillary, ethmoid, sphenoid, 2a frontal) 2/15/23 for chronic pansinusitis with polyposis. Intraoperatively, polypoid edema and osteitis throughout w mucin.\par \par He is doing very well since last seen. Sense of smell and taste are very strong. There is no foul nasal odor. He is breathing great. There is no headache or drainage. He is using mometasone rinses BID.\par \par ROS otherwise unremarkable.

## 2023-05-16 NOTE — ASSESSMENT
[FreeTextEntry1] : 47M here in routine postoperative visit s/p revision ESS (maxillary, ethmoid, sphenoid, 2a frontal) 2/15/23 for chronic pansinusitis with polyposis. Intraoperatively, polypoid edema and osteitis throughout w mucin. He is doing very well since last seen. Sense of smell and taste are very strong. There is no foul nasal odor. He is breathing great. There is no headache or drainage. He is using mometasone rinses BID. On exam, nasal endoscopy shows expected well healed postoperative changes w widely patent nasal airways and paranasal sinuses.Some crusting was removed from the left nasal cavity.\par He is doing great. Continue mometasone rinses BID. RTO 4 months in routine f/u. \par

## 2023-07-19 NOTE — BRIEF OPERATIVE NOTE - NSICDXBRIEFPROCEDURE_GEN_ALL_CORE_FT
Watch for signs of infection    Elevated temperature, redness or pain at site,   Drainage at incision. Advance Care Planning  People with COVID-19 may have no symptoms, mild symptoms, such as fever, cough, and shortness of breath or they may have more severe illness, developing severe and fatal pneumonia. As a result, Advance Care Planning with attention to naming a health care decision maker (someone you trust to make healthcare decisions for you if you could not speak for yourself) and sharing other health care preferences is important BEFORE a possible health crisis. Please contact your Primary Care Provider to discuss Advance Care Planning. Preventing the Spread of Coronavirus Disease 2019 in Homes and Residential Communities  For the most recent information go to [facility].    Prevention steps for People with confirmed or suspected COVID-19 (including persons under investigation) who do not need to be hospitalized  and   People with confirmed COVID-19 who were hospitalized and determined to be medically stable to go home    Your healthcare provider and public health staff will evaluate whether you can be cared for at home. If it is determined that you do not need to be hospitalized and can be isolated at home, you will be monitored by staff from your local or state health department. You should follow the prevention steps below until a healthcare provider or local or state health department says you can return to your normal activities. Stay home except to get medical care  People who are mildly ill with COVID-19 are able to isolate at home during their illness. You should restrict activities outside your home, except for getting medical care. Do not go to work, school, or public areas. Avoid using public transportation, ride-sharing, or taxis.   Separate yourself from other people and animals in your home  People: As much as
PROCEDURES:  FESS, with debridement 15-Feb-2023 12:03:36  Bryant Crockett

## 2023-09-19 ENCOUNTER — APPOINTMENT (OUTPATIENT)
Dept: OTOLARYNGOLOGY | Facility: CLINIC | Age: 48
End: 2023-09-19

## 2023-09-19 ENCOUNTER — APPOINTMENT (OUTPATIENT)
Dept: OTOLARYNGOLOGY | Facility: CLINIC | Age: 48
End: 2023-09-19
Payer: COMMERCIAL

## 2023-09-19 DIAGNOSIS — J34.89 OTHER SPECIFIED DISORDERS OF NOSE AND NASAL SINUSES: ICD-10-CM

## 2023-09-19 DIAGNOSIS — J32.8 OTHER CHRONIC SINUSITIS: ICD-10-CM

## 2023-09-19 PROCEDURE — 99213 OFFICE O/P EST LOW 20 MIN: CPT | Mod: 25

## 2023-09-19 PROCEDURE — 31231 NASAL ENDOSCOPY DX: CPT

## 2023-11-25 NOTE — REASON FOR VISIT
Patient medically and psychiatrically cleared to return to group home.    [Subsequent Evaluation] : a subsequent evaluation for [FreeTextEntry2] : postop

## 2024-03-05 ENCOUNTER — APPOINTMENT (OUTPATIENT)
Dept: OTOLARYNGOLOGY | Facility: CLINIC | Age: 49
End: 2024-03-05
Payer: COMMERCIAL

## 2024-03-05 VITALS
WEIGHT: 275 LBS | TEMPERATURE: 95.9 F | HEIGHT: 76 IN | DIASTOLIC BLOOD PRESSURE: 89 MMHG | HEART RATE: 90 BPM | SYSTOLIC BLOOD PRESSURE: 135 MMHG | BODY MASS INDEX: 33.49 KG/M2

## 2024-03-05 DIAGNOSIS — J33.9 NASAL POLYP, UNSPECIFIED: ICD-10-CM

## 2024-03-05 PROCEDURE — 31231 NASAL ENDOSCOPY DX: CPT

## 2024-03-05 PROCEDURE — 99213 OFFICE O/P EST LOW 20 MIN: CPT | Mod: 25

## 2024-03-05 NOTE — ASSESSMENT
[FreeTextEntry1] : 48M here in routine followup. He is s/p revision ESS (maxillary, ethmoid, sphenoid, 2a frontal) 2/15/23 for chronic pansinusitis with polyposis. Intraoperatively, polypoid edema and osteitis throughout w mucin. He is doing very well since last seen 6 months ago. Sense of smell and taste are very strong. There is no foul nasal odor. He is breathing great. There is no headache or drainage. He is using mometasone rinses, but not regularly, and knows he should be doing it more often. On exam, nasal endoscopy shows expected well healed postoperative changes w widely patent nasal airways and paranasal sinuses. There is some polypoid edema by either anterior olfactory cleft and frontal outflows, but nonobstructive and no mucin.  He is doing very well. Continue mometasone rinses BID. RTO 4-6 months in routine f/u.

## 2024-03-05 NOTE — PROCEDURE
[FreeTextEntry3] : Nasal Endoscopy: nasal airways widely patent paranasal sinuses widely patent, minimal frontal outflow polypoid edema and mild polyps by anterior olfactory clefts no crusting/debris no mucopus, no mucin

## 2024-03-05 NOTE — HISTORY OF PRESENT ILLNESS
[de-identified] : 48M here in routine followup.  He is s/p revision ESS (maxillary, ethmoid, sphenoid, 2a frontal) 2/15/23 for chronic pansinusitis with polyposis. Intraoperatively, polypoid edema and osteitis throughout w mucin.  He is doing very well since last seen 6 months ago. Sense of smell and taste are very strong. There is no foul nasal odor. He is breathing great. There is no headache or drainage. He is using mometasone rinses, but not regularly, and knows he should be doing it more often.  ROS otherwise unremarkable.

## 2024-03-05 NOTE — CONSULT LETTER
[Dear  ___] : Dear  [unfilled], [Courtesy Letter:] : I had the pleasure of seeing your patient, [unfilled], in my office today. [Consult Closing:] : Thank you very much for allowing me to participate in the care of this patient.  If you have any questions, please do not hesitate to contact me. [Sincerely,] : Sincerely, [Ruben Burciaga MD] : Ruben Burciaga MD  [Department of Otolaryngology, Head and Neck Surgery] : Department of Otolaryngology, Head and Neck Surgery [[facility]] : [facility]

## 2024-09-10 ENCOUNTER — APPOINTMENT (OUTPATIENT)
Dept: OTOLARYNGOLOGY | Facility: CLINIC | Age: 49
End: 2024-09-10

## 2024-12-25 PROBLEM — F10.90 ALCOHOL USE: Status: ACTIVE | Noted: 2017-03-21

## 2025-01-03 ENCOUNTER — NON-APPOINTMENT (OUTPATIENT)
Age: 50
End: 2025-01-03

## 2025-01-03 ENCOUNTER — APPOINTMENT (OUTPATIENT)
Dept: OTOLARYNGOLOGY | Facility: CLINIC | Age: 50
End: 2025-01-03
Payer: COMMERCIAL

## 2025-01-03 DIAGNOSIS — J33.9 NASAL POLYP, UNSPECIFIED: ICD-10-CM

## 2025-01-03 DIAGNOSIS — J32.8 OTHER CHRONIC SINUSITIS: ICD-10-CM

## 2025-01-03 DIAGNOSIS — H61.23 IMPACTED CERUMEN, BILATERAL: ICD-10-CM

## 2025-01-03 PROCEDURE — 99213 OFFICE O/P EST LOW 20 MIN: CPT | Mod: 25

## 2025-01-03 PROCEDURE — 31231 NASAL ENDOSCOPY DX: CPT

## 2025-01-03 RX ORDER — PREDNISONE 10 MG/1
10 TABLET ORAL
Qty: 28 | Refills: 0 | Status: ACTIVE | COMMUNITY
Start: 2025-01-03 | End: 1900-01-01

## 2025-01-03 RX ORDER — LEVOFLOXACIN 750 MG/1
750 TABLET, FILM COATED ORAL DAILY
Qty: 14 | Refills: 0 | Status: ACTIVE | COMMUNITY
Start: 2025-01-03 | End: 1900-01-01

## 2025-01-09 LAB — EAR NOSE AND THROAT CULTURE: NORMAL

## 2025-08-04 NOTE — ASU PATIENT PROFILE, ADULT - DOES PATIENT HAVE ADVANCE DIRECTIVE
08/04/25 1101   Rapid Rounds   Attendance Provider;Care Transitions   Expected Discharge Disposition Inter   Today we still await: Clinical stability   Review at Escalation Rounds No escalation needed     Updated progress notes sent to [facility]. Anticipating discharge tomorrow.      Alyssa Bryant RN, BSN  Transitional Care Coordinator    No

## (undated) DEVICE — ACCLARENT SET INFLATION DEVICE

## (undated) DEVICE — DRSG TELFA 3 X 8

## (undated) DEVICE — BLADE MEDTRONIC ENT RAD 90 DEGREE ROTATABLE 3.5MM X 11CM

## (undated) DEVICE — SYR ASEPTO

## (undated) DEVICE — BLADE MEDTRONIC ENT FUSION QUADCUT ROTATABLE STRAIGHT 4.3MM X 13CM

## (undated) DEVICE — BLADE MEDTRONIC ENT RAD 60 DEGREE ROTATABLE 4MM X 11CM

## (undated) DEVICE — PETRI DISH MED 3.5"

## (undated) DEVICE — SUT CHROMIC 4-0 18" G-3

## (undated) DEVICE — WARMING BLANKET LOWER ADULT

## (undated) DEVICE — STAPLER SKIN VISI-STAT 35 WIDE

## (undated) DEVICE — Device

## (undated) DEVICE — SUT PLAIN GUT 4-0 18" SC-1

## (undated) DEVICE — MARKING PEN W RULER

## (undated) DEVICE — SYR LUER LOK 50CC

## (undated) DEVICE — GLV 7 PROTEXIS (WHITE)

## (undated) DEVICE — DRAPE MAYO STAND 23"

## (undated) DEVICE — ELCTR BOVIE SUCTION 8FR 6"

## (undated) DEVICE — MEDTRONIC INSTRUMENT TRACKER ENT

## (undated) DEVICE — PACK RHINOPLASTY

## (undated) DEVICE — BLADE MEDTRONIC ENT INFERIOR TURBINATE ROTATABLE STRAIGHT 2.9MM X 11CM

## (undated) DEVICE — SOL ANTI FOG

## (undated) DEVICE — SUT PROLENE 2-0 30" CT-2

## (undated) DEVICE — SLV COMPRESSION KNEE MED

## (undated) DEVICE — DRSG GAUZE SPONGE 2X2" STERILE

## (undated) DEVICE — MEDTRONIC AXIEM PATIENT TRACKER NON-INVASIVE

## (undated) DEVICE — BUR MEDTRONIC ENT TAPERED DIAMOND CHOANAL ATRESIA 15 DEGREE 4MM X 13CM